# Patient Record
Sex: FEMALE | Race: WHITE | Employment: FULL TIME | URBAN - METROPOLITAN AREA
[De-identification: names, ages, dates, MRNs, and addresses within clinical notes are randomized per-mention and may not be internally consistent; named-entity substitution may affect disease eponyms.]

---

## 2019-09-22 ENCOUNTER — APPOINTMENT (OUTPATIENT)
Dept: GENERAL RADIOLOGY | Age: 59
End: 2019-09-22
Attending: EMERGENCY MEDICINE
Payer: COMMERCIAL

## 2019-09-22 ENCOUNTER — HOSPITAL ENCOUNTER (EMERGENCY)
Age: 59
Discharge: HOME | End: 2019-09-22
Attending: EMERGENCY MEDICINE
Payer: COMMERCIAL

## 2019-09-22 VITALS
HEIGHT: 64 IN | OXYGEN SATURATION: 98 % | HEART RATE: 85 BPM | WEIGHT: 167 LBS | BODY MASS INDEX: 28.51 KG/M2 | DIASTOLIC BLOOD PRESSURE: 55 MMHG | TEMPERATURE: 98.1 F | RESPIRATION RATE: 15 BRPM | SYSTOLIC BLOOD PRESSURE: 98 MMHG

## 2019-09-22 DIAGNOSIS — R07.89 ATYPICAL CHEST PAIN: Primary | ICD-10-CM

## 2019-09-22 LAB
ALBUMIN SERPL-MCNC: 3.8 G/DL (ref 3.5–5)
ALBUMIN/GLOB SERPL: 1 {RATIO} (ref 1.1–2.2)
ALP SERPL-CCNC: 71 U/L (ref 45–117)
ALT SERPL-CCNC: 28 U/L (ref 12–78)
ANION GAP SERPL CALC-SCNC: 6 MMOL/L (ref 5–15)
AST SERPL-CCNC: 17 U/L (ref 15–37)
BASOPHILS # BLD: 0.1 K/UL (ref 0–0.1)
BASOPHILS NFR BLD: 1 % (ref 0–1)
BILIRUB SERPL-MCNC: 0.8 MG/DL (ref 0.2–1)
BUN SERPL-MCNC: 27 MG/DL (ref 6–20)
BUN/CREAT SERPL: 35 (ref 12–20)
CALCIUM SERPL-MCNC: 8.3 MG/DL (ref 8.5–10.1)
CHLORIDE SERPL-SCNC: 108 MMOL/L (ref 97–108)
CO2 SERPL-SCNC: 29 MMOL/L (ref 21–32)
COMMENT, HOLDF: NORMAL
CREAT SERPL-MCNC: 0.78 MG/DL (ref 0.55–1.02)
D DIMER PPP FEU-MCNC: 0.3 MG/L FEU (ref 0–0.65)
DIFFERENTIAL METHOD BLD: ABNORMAL
EOSINOPHIL # BLD: 0.2 K/UL (ref 0–0.4)
EOSINOPHIL NFR BLD: 3 % (ref 0–7)
ERYTHROCYTE [DISTWIDTH] IN BLOOD BY AUTOMATED COUNT: 12.1 % (ref 11.5–14.5)
GLOBULIN SER CALC-MCNC: 3.7 G/DL (ref 2–4)
GLUCOSE SERPL-MCNC: 96 MG/DL (ref 65–100)
HCT VFR BLD AUTO: 38.5 % (ref 35–47)
HGB BLD-MCNC: 12.5 G/DL (ref 11.5–16)
IMM GRANULOCYTES # BLD AUTO: 0 K/UL (ref 0–0.04)
IMM GRANULOCYTES NFR BLD AUTO: 1 % (ref 0–0.5)
LYMPHOCYTES # BLD: 3 K/UL (ref 0.8–3.5)
LYMPHOCYTES NFR BLD: 38 % (ref 12–49)
MCH RBC QN AUTO: 30.6 PG (ref 26–34)
MCHC RBC AUTO-ENTMCNC: 32.5 G/DL (ref 30–36.5)
MCV RBC AUTO: 94.1 FL (ref 80–99)
MONOCYTES # BLD: 0.6 K/UL (ref 0–1)
MONOCYTES NFR BLD: 8 % (ref 5–13)
NEUTS SEG # BLD: 4 K/UL (ref 1.8–8)
NEUTS SEG NFR BLD: 49 % (ref 32–75)
NRBC # BLD: 0 K/UL (ref 0–0.01)
NRBC BLD-RTO: 0 PER 100 WBC
PLATELET # BLD AUTO: 236 K/UL (ref 150–400)
PMV BLD AUTO: 10.3 FL (ref 8.9–12.9)
POTASSIUM SERPL-SCNC: 3.5 MMOL/L (ref 3.5–5.1)
PROT SERPL-MCNC: 7.5 G/DL (ref 6.4–8.2)
RBC # BLD AUTO: 4.09 M/UL (ref 3.8–5.2)
SAMPLES BEING HELD,HOLD: NORMAL
SODIUM SERPL-SCNC: 143 MMOL/L (ref 136–145)
TROPONIN I BLD-MCNC: <0.04 NG/ML (ref 0–0.08)
TROPONIN I SERPL-MCNC: <0.05 NG/ML
WBC # BLD AUTO: 7.9 K/UL (ref 3.6–11)

## 2019-09-22 PROCEDURE — 74011250636 HC RX REV CODE- 250/636: Performed by: EMERGENCY MEDICINE

## 2019-09-22 PROCEDURE — 96374 THER/PROPH/DIAG INJ IV PUSH: CPT

## 2019-09-22 PROCEDURE — 84484 ASSAY OF TROPONIN QUANT: CPT

## 2019-09-22 PROCEDURE — 80053 COMPREHEN METABOLIC PANEL: CPT

## 2019-09-22 PROCEDURE — 36415 COLL VENOUS BLD VENIPUNCTURE: CPT

## 2019-09-22 PROCEDURE — 99285 EMERGENCY DEPT VISIT HI MDM: CPT

## 2019-09-22 PROCEDURE — 85025 COMPLETE CBC W/AUTO DIFF WBC: CPT

## 2019-09-22 PROCEDURE — 71045 X-RAY EXAM CHEST 1 VIEW: CPT

## 2019-09-22 PROCEDURE — 85379 FIBRIN DEGRADATION QUANT: CPT

## 2019-09-22 RX ORDER — KETOROLAC TROMETHAMINE 30 MG/ML
15 INJECTION, SOLUTION INTRAMUSCULAR; INTRAVENOUS
Status: COMPLETED | OUTPATIENT
Start: 2019-09-22 | End: 2019-09-22

## 2019-09-22 RX ADMIN — SODIUM CHLORIDE 1000 ML: 900 INJECTION, SOLUTION INTRAVENOUS at 03:41

## 2019-09-22 RX ADMIN — KETOROLAC TROMETHAMINE 15 MG: 30 INJECTION, SOLUTION INTRAMUSCULAR at 03:42

## 2019-09-22 NOTE — ED NOTES
Discharge Instructions Reviewed with patient per this nurse. Discharge instructions given to patient per this nurse. Patient able to return verbalize discharge instructions. Paper copy of discharge instructions given. No RX given. Patient condition stable, Respiratory status WNL, Neurostatus intact.  Ambulatory out of er, to home with spouse'

## 2019-09-22 NOTE — DISCHARGE INSTRUCTIONS

## 2019-09-22 NOTE — ED TRIAGE NOTES
Pt. States woke up with pain on right side of chest and in back, states could not take a deep breath. Pt. States took mitch aspirin PTA. Pt. States has felt lethargic the past two weeks, slight cough noted per patient.

## 2019-09-22 NOTE — ED PROVIDER NOTES
62 y.o. female with no significant past medical history who presents from home accompanied by her  with chief complaint of chest pain. Pt states she woke up at 0245 this morning to use the bathroom, when sitting down she had onset of right sided chest pain. She notes the chest pain radiates through to her back and is associated with shortness of breath, which she describes as her \"unable to take a breath\" secondary to the pain. She reports taking a 325 mg ASA PTA to the ED, noting some improvement of her pain and breathing since arrival. She denies any prior history of similar symptoms in the past and denies any significant cardiac history. Pt specifically denies any diaphoresis, numbness, tingling, leg swelling, leg pain, nausea, vomiting. There are no other acute medical concerns at this time. Social Hx: negative tobacco use, negative EtOH use, negative Illicit Drug use    PCP: Debi Willingham MD    Note written by Alma Jordan, as dictated by Jame Polanco MD 3:30 AM    The history is provided by the spouse. No  was used. No past medical history on file. No past surgical history on file. No family history on file.     Social History     Socioeconomic History    Marital status:      Spouse name: Not on file    Number of children: Not on file    Years of education: Not on file    Highest education level: Not on file   Occupational History    Not on file   Social Needs    Financial resource strain: Not on file    Food insecurity:     Worry: Not on file     Inability: Not on file    Transportation needs:     Medical: Not on file     Non-medical: Not on file   Tobacco Use    Smoking status: Not on file   Substance and Sexual Activity    Alcohol use: Not on file    Drug use: Not on file    Sexual activity: Not on file   Lifestyle    Physical activity:     Days per week: Not on file     Minutes per session: Not on file    Stress: Not on file   Relationships    Social connections:     Talks on phone: Not on file     Gets together: Not on file     Attends Islam service: Not on file     Active member of club or organization: Not on file     Attends meetings of clubs or organizations: Not on file     Relationship status: Not on file    Intimate partner violence:     Fear of current or ex partner: Not on file     Emotionally abused: Not on file     Physically abused: Not on file     Forced sexual activity: Not on file   Other Topics Concern    Not on file   Social History Narrative    Not on file         ALLERGIES: Other medication    Review of Systems   Constitutional: Negative for diaphoresis. Respiratory: Positive for shortness of breath. Cardiovascular: Positive for chest pain. Negative for leg swelling. Gastrointestinal: Negative for nausea and vomiting. Musculoskeletal: Positive for back pain.        -leg pain   Neurological: Negative for numbness.        -tingling   All other systems reviewed and are negative. Vitals:    09/22/19 0330   BP: 143/82   Pulse: 82   Resp: 19   Temp: 98.1 °F (36.7 °C)   SpO2: 100%   Weight: 75.8 kg (167 lb)   Height: 5' 4\" (1.626 m)            Physical Exam   Constitutional: She appears well-developed and well-nourished. No distress. HENT:   Head: Normocephalic and atraumatic. Mouth/Throat: Oropharynx is clear and moist.   Eyes: Conjunctivae and EOM are normal.   Neck: Normal range of motion and phonation normal.   Cardiovascular: Normal rate and regular rhythm. Pulses:       Dorsalis pedis pulses are 2+ on the right side, and 2+ on the left side. Pulmonary/Chest: Effort normal. No respiratory distress. She has no wheezes. She has no rales. Abdominal: She exhibits no distension. Musculoskeletal: Normal range of motion. She exhibits no tenderness. Neurological: She is alert. She is not disoriented. She exhibits normal muscle tone. Skin: Skin is warm and dry.  Capillary refill takes less than 2 seconds. Nursing note and vitals reviewed. ED EKG interpretation:  Rhythm: normal sinus rhythm; and regular . Rate (approx.): 78; Axis: normal; P wave: normal; QRS interval: normal ; ST/T wave: normal; Other findings: normal. This EKG was interpreted by Pasha Richard MD,ED Provider. MDM       7:05 AM  Repeat trop normal.  Patient states pain resolved after toradol. CXR normal. Will discharge home.    Procedures